# Patient Record
Sex: MALE | Race: WHITE | Employment: UNEMPLOYED | URBAN - METROPOLITAN AREA
[De-identification: names, ages, dates, MRNs, and addresses within clinical notes are randomized per-mention and may not be internally consistent; named-entity substitution may affect disease eponyms.]

---

## 2024-04-09 ENCOUNTER — HOSPITAL ENCOUNTER (INPATIENT)
Facility: HOSPITAL | Age: 64
LOS: 3 days | Discharge: HOME | End: 2024-04-12
Attending: STUDENT IN AN ORGANIZED HEALTH CARE EDUCATION/TRAINING PROGRAM | Admitting: STUDENT IN AN ORGANIZED HEALTH CARE EDUCATION/TRAINING PROGRAM
Payer: MEDICARE

## 2024-04-09 DIAGNOSIS — F31.9 BIPOLAR 1 DISORDER (HCC): Primary | ICD-10-CM

## 2024-04-09 LAB
GLUCOSE BLD STRIP.AUTO-MCNC: 170 MG/DL (ref 65–117)
SERVICE CMNT-IMP: ABNORMAL

## 2024-04-09 PROCEDURE — 82962 GLUCOSE BLOOD TEST: CPT

## 2024-04-09 PROCEDURE — 6370000000 HC RX 637 (ALT 250 FOR IP): Performed by: PSYCHIATRY & NEUROLOGY

## 2024-04-09 PROCEDURE — 1240000000 HC EMOTIONAL WELLNESS R&B

## 2024-04-09 RX ORDER — HALOPERIDOL 5 MG/ML
5 INJECTION INTRAMUSCULAR EVERY 4 HOURS PRN
Status: DISCONTINUED | OUTPATIENT
Start: 2024-04-09 | End: 2024-04-12 | Stop reason: HOSPADM

## 2024-04-09 RX ORDER — HYDROXYZINE HYDROCHLORIDE 25 MG/1
50 TABLET, FILM COATED ORAL 3 TIMES DAILY PRN
Status: DISCONTINUED | OUTPATIENT
Start: 2024-04-09 | End: 2024-04-09

## 2024-04-09 RX ORDER — CLONAZEPAM 1 MG/1
1 TABLET ORAL 3 TIMES DAILY PRN
Status: DISCONTINUED | OUTPATIENT
Start: 2024-04-09 | End: 2024-04-12 | Stop reason: HOSPADM

## 2024-04-09 RX ORDER — BUPROPION HYDROCHLORIDE 150 MG/1
300 TABLET ORAL EVERY MORNING
Status: DISCONTINUED | OUTPATIENT
Start: 2024-04-09 | End: 2024-04-12 | Stop reason: HOSPADM

## 2024-04-09 RX ORDER — DIPHENHYDRAMINE HYDROCHLORIDE 50 MG/ML
50 INJECTION INTRAMUSCULAR; INTRAVENOUS EVERY 4 HOURS PRN
Status: DISCONTINUED | OUTPATIENT
Start: 2024-04-09 | End: 2024-04-12 | Stop reason: HOSPADM

## 2024-04-09 RX ORDER — ACETAMINOPHEN 325 MG/1
650 TABLET ORAL EVERY 4 HOURS PRN
Status: DISCONTINUED | OUTPATIENT
Start: 2024-04-09 | End: 2024-04-12 | Stop reason: HOSPADM

## 2024-04-09 RX ORDER — PENTOXIFYLLINE 400 MG/1
800 TABLET, EXTENDED RELEASE ORAL 2 TIMES DAILY
Status: DISCONTINUED | OUTPATIENT
Start: 2024-04-09 | End: 2024-04-12 | Stop reason: HOSPADM

## 2024-04-09 RX ORDER — BUPROPION HYDROCHLORIDE 300 MG/1
300 TABLET ORAL EVERY MORNING
Status: ON HOLD | COMMUNITY
End: 2024-04-12

## 2024-04-09 RX ORDER — POLYETHYLENE GLYCOL 3350 17 G/17G
17 POWDER, FOR SOLUTION ORAL DAILY PRN
Status: DISCONTINUED | OUTPATIENT
Start: 2024-04-09 | End: 2024-04-12 | Stop reason: HOSPADM

## 2024-04-09 RX ORDER — MAGNESIUM HYDROXIDE/ALUMINUM HYDROXICE/SIMETHICONE 120; 1200; 1200 MG/30ML; MG/30ML; MG/30ML
30 SUSPENSION ORAL EVERY 6 HOURS PRN
Status: DISCONTINUED | OUTPATIENT
Start: 2024-04-09 | End: 2024-04-12 | Stop reason: HOSPADM

## 2024-04-09 RX ORDER — ROSUVASTATIN CALCIUM 5 MG/1
5 TABLET, COATED ORAL DAILY
Status: ON HOLD | COMMUNITY
End: 2024-04-12

## 2024-04-09 RX ORDER — GABAPENTIN 300 MG/1
300 CAPSULE ORAL 3 TIMES DAILY
Status: ON HOLD | COMMUNITY
End: 2024-04-12

## 2024-04-09 RX ORDER — MINOXIDIL 2.5 MG/1
7.5 TABLET ORAL DAILY
Status: DISCONTINUED | OUTPATIENT
Start: 2024-04-09 | End: 2024-04-12 | Stop reason: HOSPADM

## 2024-04-09 RX ORDER — PENTOXIFYLLINE 400 MG/1
800 TABLET, EXTENDED RELEASE ORAL 2 TIMES DAILY
Status: ON HOLD | COMMUNITY
End: 2024-04-12

## 2024-04-09 RX ORDER — HALOPERIDOL 5 MG/1
5 TABLET ORAL EVERY 4 HOURS PRN
Status: DISCONTINUED | OUTPATIENT
Start: 2024-04-09 | End: 2024-04-12 | Stop reason: HOSPADM

## 2024-04-09 RX ORDER — TADALAFIL 10 MG/1
10 TABLET ORAL DAILY
COMMUNITY

## 2024-04-09 RX ORDER — TRAZODONE HYDROCHLORIDE 50 MG/1
50 TABLET ORAL NIGHTLY PRN
Status: DISCONTINUED | OUTPATIENT
Start: 2024-04-09 | End: 2024-04-12 | Stop reason: HOSPADM

## 2024-04-09 RX ORDER — ROSUVASTATIN CALCIUM 10 MG/1
5 TABLET, COATED ORAL DAILY
Status: DISCONTINUED | OUTPATIENT
Start: 2024-04-09 | End: 2024-04-12 | Stop reason: HOSPADM

## 2024-04-09 RX ORDER — FINASTERIDE 5 MG/1
5 TABLET, FILM COATED ORAL DAILY
Status: DISCONTINUED | OUTPATIENT
Start: 2024-04-09 | End: 2024-04-12 | Stop reason: HOSPADM

## 2024-04-09 RX ORDER — GABAPENTIN 300 MG/1
300 CAPSULE ORAL 3 TIMES DAILY
Status: DISCONTINUED | OUTPATIENT
Start: 2024-04-09 | End: 2024-04-12 | Stop reason: HOSPADM

## 2024-04-09 RX ORDER — CLONAZEPAM 1 MG/1
1 TABLET ORAL 3 TIMES DAILY PRN
Status: ON HOLD | COMMUNITY
End: 2024-04-12

## 2024-04-09 RX ORDER — DUTASTERIDE 0.5 MG/1
0.5 CAPSULE, LIQUID FILLED ORAL DAILY
Status: ON HOLD | COMMUNITY
End: 2024-04-12

## 2024-04-09 RX ORDER — MINOXIDIL 2.5 MG/1
7.5 TABLET ORAL DAILY
Status: ON HOLD | COMMUNITY
End: 2024-04-12

## 2024-04-09 RX ADMIN — CLONAZEPAM 1 MG: 1 TABLET ORAL at 22:33

## 2024-04-09 RX ADMIN — FINASTERIDE 5 MG: 5 TABLET, FILM COATED ORAL at 14:32

## 2024-04-09 RX ADMIN — ROSUVASTATIN CALCIUM 5 MG: 10 TABLET, COATED ORAL at 14:31

## 2024-04-09 RX ADMIN — METFORMIN HYDROCHLORIDE 1000 MG: 500 TABLET ORAL at 16:58

## 2024-04-09 RX ADMIN — GABAPENTIN 300 MG: 300 CAPSULE ORAL at 22:25

## 2024-04-09 RX ADMIN — PENTOXIFYLLINE 800 MG: 400 TABLET, EXTENDED RELEASE ORAL at 23:01

## 2024-04-09 RX ADMIN — BUPROPION HYDROCHLORIDE 300 MG: 150 TABLET, FILM COATED, EXTENDED RELEASE ORAL at 14:31

## 2024-04-09 RX ADMIN — GABAPENTIN 300 MG: 300 CAPSULE ORAL at 14:34

## 2024-04-09 RX ADMIN — MINOXIDIL 7.5 MG: 2.5 TABLET ORAL at 16:33

## 2024-04-09 ASSESSMENT — SLEEP AND FATIGUE QUESTIONNAIRES
DO YOU HAVE DIFFICULTY SLEEPING: NO
DO YOU USE A SLEEP AID: NO
AVERAGE NUMBER OF SLEEP HOURS: 8

## 2024-04-09 ASSESSMENT — PAIN SCALES - GENERAL
PAINLEVEL_OUTOF10: 3
PAINLEVEL_OUTOF10: 4

## 2024-04-09 ASSESSMENT — PAIN DESCRIPTION - LOCATION
LOCATION: FOOT
LOCATION: FOOT

## 2024-04-09 ASSESSMENT — PAIN DESCRIPTION - ORIENTATION
ORIENTATION: RIGHT
ORIENTATION: RIGHT;LEFT

## 2024-04-09 ASSESSMENT — LIFESTYLE VARIABLES
HOW MANY STANDARD DRINKS CONTAINING ALCOHOL DO YOU HAVE ON A TYPICAL DAY: PATIENT DOES NOT DRINK
HOW OFTEN DO YOU HAVE A DRINK CONTAINING ALCOHOL: NEVER

## 2024-04-09 ASSESSMENT — PAIN DESCRIPTION - DESCRIPTORS
DESCRIPTORS: ACHING
DESCRIPTORS: ACHING

## 2024-04-09 NOTE — PROGRESS NOTES
Patient arrived on Unit at 0655 in transfer from [facility] ED. C-SSRS scored high on patient's arrival. Patient received skin search for safety by myself and Reginaldo EARLT with [facility]. Patient is placed on 1:1 observation with Reginaldo DEL TORO monitoring patient. Patient currently denies thoughts, plans or intent to harm self.

## 2024-04-09 NOTE — GROUP NOTE
Group Therapy Note    Date: 4/9/2024    Group Start Time: 1400  Group End Time: 1500  Group Topic: Recreational    [facility]    Melba Weir        Group Therapy Note    Attendees: 5       Patient's Goal:  To concentrate on selected task    Notes:  Pt did not attend session  Discipline Responsible: Recreational Therapist      Signature:  MELBA WEIR

## 2024-04-09 NOTE — BH NOTE
[facility]  Admission Note     Admission Type: TDO      Reason for admission:   Pt with diagnosis of bipolar d/o, polysubstance use d/o..  pt began having paranoid delusions whil on meth which led him to attemp uicide by taking 60-70  Gabapentin and 10-15 Klonopin which did not show up in his blood work (klonopin).  Pt states people are watching him in attic and camera in bathroom mirror.  This ws his 3rd attempt in the last 5 years with the same method.         Medical Problems:   Past Medical History:   Diagnosis Date    Depression     Pt been on disability for Depression since his 40's    DM (diabetes mellitus) ([facility], located within [facility])     non insulin    Reflux gastritis        Status EXAM:  Mental Status and Behavioral Exam  Normal: No  Level of Assistance: Independent/Self  Facial Expression: Sad, Flat  Affect: Constricted  Level of Consciousness: Alert  Frequency of Checks: 4 times per hour, close  Mood:Normal: No  Mood: Depressed, Anxious  Motor Activity:Normal: No  Motor Activity: Decreased  Eye Contact: Fair  Observed Behavior: Withdrawn  Sexual Misconduct History: Current - no  Preception: [place] to person, [place] to time, [place] to place, [place] to situation  Attention:Normal: Yes  Thought Processes: Unremarkable  Thought Content:Normal: No  Thought Content: Preoccupations  Depression Symptoms: Isolative  Anxiety Symptoms: Generalized  Jada Symptoms: No problems reported or observed.  Hallucinations: None  Delusions: No  Memory:Normal: Yes  Insight and Judgment: No  Insight and Judgment: Poor judgment    Pt admitted with followings belongings:  Dental Appliances: None  Vision - Corrective Lenses: Contact Lenses (one eye only - provided container with solution for pt)  Hearing Aid: None  Jewelry: None  Body Piercings Removed: No  Clothing: Shirt, Socks, Pants, Jacket/Coat  Other Valuables: Cigarettes, Keys, Wallet, Credit/Debit Card, Lighter/Matches     Valuables placed in safe in security envelope.

## 2024-04-09 NOTE — PROGRESS NOTES
[facility] Admission Pharmacy Medication Reconciliation    Information obtained from: Patient interview, RxQuery, [facility]  RxQuery data available1: Yes    Comments/recommendations:  Patient is a good medication historian - able to recite names, dosages and frequencies of most of his medications. He reports compliance with medications.  However, TDO pre-screening collateral patient will stockpile gabapentin and clonazepam so he will have enough pills to overdose on.  Will go to different doctors to obtain prescriptions.      Reports taking clonazepam scheduled TID but prescription is written for TID PRN.  Based on information above, updated PTA list with TID PRN frequency.     The [place] Prescription Monitoring Program () was accessed to determine fill history of any controlled medications.  Routinely fills clonazepam 1 mg tablets - most recently filled #90 for 30 DS on 3/19/24.  Despite what collateral reported in pre-screening assessment, the majority of scripts were written by the same docotor.  Routinely fills gabapentin 300 mg capsules - most recently filled #270 for 90 DS on 2/22/24. Prescribed by same provider who prescribes clonazepam    Medication changes (since last review):  Added  Added all medications listed below  Removed  None  Adjusted  None       1RxQuery pharmacy benefit data reflects medications filled and processed through the patient's insurance, however                this data does NOT capture whether the medication was picked up or is currently being taken by the patient.         Patient allergies:   Allergies as of 04/09/2024    (No Known Allergies)       Prior to Admission Medications   Prescriptions Last Dose Informant   Arginine 1000 MG TABS  Self   Sig: Take 3,000 mg by mouth daily   NONFORMULARY  Self   Sig: \"2perday\" vitamin & mineral supplement   buPROPion (WELLBUTRIN XL) 300 MG extended release tablet 4/7/2024 Self, Outside Pharmacy/PCP   Sig: Take 1 tablet by mouth every morning

## 2024-04-09 NOTE — PROGRESS NOTES
Behavioral Services  Medicare Certification Upon Admission    I certify that this patient's inpatient psychiatric hospital admission is medically necessary for:    [x] (1) Treatment which could reasonably be expected to improve this patient's condition,       [x] (2) Or for diagnostic study;     AND     [x](2) The inpatient psychiatric services are provided while the individual is under the care of a physician and are included in the individualized plan of care.    Estimated length of stay/service 5 - 7 days    Plan for post-hospital care per social work    Electronically signed by Sky Bradshaw MD on 4/9/2024 at 12:07 PM

## 2024-04-09 NOTE — BH NOTE
Dr Bradshaw notified of C-SSRS score of high and patient's presentation. Constant observation and suicide precautions discontinued due to patient's denial of current SI and he presents as without intent to harm himself within the hospital. Will monitor with q 15 minute observation.

## 2024-04-09 NOTE — PLAN OF CARE
Problem: Self Harm/Suicidality  Goal: Will have no self-injury during hospital stay  Description: INTERVENTIONS:  1.  Ensure constant observer at bedside with Q15M safety checks  2.  Maintain a safe environment  3.  Secure patient belongings  4.  Ensure family/visitors adhere to safety recommendations  5.  Ensure safety tray has been added to patient's diet order  6.  Every shift and PRN: Re-assess suicidal risk via Frequent Screener    4/9/2024 1834 by Vi Macias, RN  Outcome: Not Progressing  4/9/2024 0735 by Lexi Lorenzana, RN  Outcome: Progressing     Problem: Anxiety  Goal: Will report anxiety at manageable levels  Description: INTERVENTIONS:  1. Administer medication as ordered  2. Teach and rehearse alternative coping skills  3. Provide emotional support with 1:1 interaction with staff  Outcome: Not Progressing     Problem: Depression/Self Harm  Goal: Effect of psychiatric condition will be minimized and patient will be protected from self harm  Description: INTERVENTIONS:  1. Assess impact of patient's symptoms on level of functioning, self care needs and offer support as indicated  2. Assess patient/family knowledge of depression, impact on illness and need for teaching  3. Provide emotional support, presence and reassurance  4. Assess for possible suicidal thoughts or ideation. If patient expresses suicidal thoughts or statements do not leave alone, initiate Suicide Precautions, move to a room close to the nursing station and obtain sitter  5. Initiate consults as appropriate with Mental Health Professional, Spiritual Care, Psychosocial CNS, and consider a recommendation to the LIP for a Psychiatric Consultation  Outcome: Not Progressing

## 2024-04-10 PROCEDURE — 6370000000 HC RX 637 (ALT 250 FOR IP): Performed by: PSYCHIATRY & NEUROLOGY

## 2024-04-10 PROCEDURE — 1240000000 HC EMOTIONAL WELLNESS R&B

## 2024-04-10 RX ADMIN — MINOXIDIL 7.5 MG: 2.5 TABLET ORAL at 08:51

## 2024-04-10 RX ADMIN — GABAPENTIN 300 MG: 300 CAPSULE ORAL at 12:58

## 2024-04-10 RX ADMIN — ROSUVASTATIN CALCIUM 5 MG: 10 TABLET, COATED ORAL at 08:51

## 2024-04-10 RX ADMIN — GABAPENTIN 300 MG: 300 CAPSULE ORAL at 21:01

## 2024-04-10 RX ADMIN — CLONAZEPAM 1 MG: 1 TABLET ORAL at 08:51

## 2024-04-10 RX ADMIN — FINASTERIDE 5 MG: 5 TABLET, FILM COATED ORAL at 08:51

## 2024-04-10 RX ADMIN — BUPROPION HYDROCHLORIDE 300 MG: 150 TABLET, FILM COATED, EXTENDED RELEASE ORAL at 08:51

## 2024-04-10 RX ADMIN — PENTOXIFYLLINE 800 MG: 400 TABLET, EXTENDED RELEASE ORAL at 21:01

## 2024-04-10 RX ADMIN — PENTOXIFYLLINE 800 MG: 400 TABLET, EXTENDED RELEASE ORAL at 11:00

## 2024-04-10 RX ADMIN — GABAPENTIN 300 MG: 300 CAPSULE ORAL at 08:50

## 2024-04-10 RX ADMIN — METFORMIN HYDROCHLORIDE 1000 MG: 500 TABLET ORAL at 08:51

## 2024-04-10 RX ADMIN — CLONAZEPAM 1 MG: 1 TABLET ORAL at 17:08

## 2024-04-10 RX ADMIN — METFORMIN HYDROCHLORIDE 1000 MG: 500 TABLET ORAL at 17:08

## 2024-04-10 NOTE — PROGRESS NOTES
Johnathon remained in his room and did not socialize. He denied SI, HI, AVH. He c/o pain in his right foot, states it's chronic, rated it about a 3. He refused offer of PRN for pain. He was med compliant with his scheduled meds. He requested and received PRN Klonopin for anxiety and appeared to be sleeping when reassessed an hour later.

## 2024-04-10 NOTE — PLAN OF CARE
DESTINI Elliott  Outcome: Progressing  4/9/2024 1839 by Vi Macias RN  Outcome: Not Progressing     Problem: Spiritual Care  Goal: Pt/Family able to move forward in process of forgiving self, others, and/or higher power  Description: INTERVENTIONS:  1. Assist patient/family to overcome blocks to healing by use of spiritual practices (prayer, meditation, guided imagery, reiki, breath work, etc).  2. De-myth guilt and help patient/family identify possible irrational spiritual/cultural beliefs and values.  3. Explore possibilities of making amends & reconciliation with self, others, and/or a greater power.  4. Guide patient/family in identifying painful feelings of guilt.  5. Help patient/famiy explore and identify spiritual beliefs, cultural understandings or values that may help or hinder letting go of issue.  6. Help patient/family explore feelings of anger, bitterness, resentment.  7. Help patient/family identify and examine the situation in which these feelings are experienced.  8. Help patient/family identify destructive displacement of feelings onto other individuals.  9. Invite use of sacraments/rituals/ceremonies as appropriate (e.g. - confession, anointing, smudging).  10. Refer patient/family to formal counseling and/or to toni community for further support work.  Outcome: Progressing     Problem: Self Harm/Suicidality  Goal: Will have no self-injury during hospital stay  Description: INTERVENTIONS:  1.  Ensure constant observer at bedside with Q15M safety checks  2.  Maintain a safe environment  3.  Secure patient belongings  4.  Ensure family/visitors adhere to safety recommendations  5.  Ensure safety tray has been added to patient's diet order  6.  Every shift and PRN: Re-assess suicidal risk via Frequent Screener    4/10/2024 0136 by Lexi Lorenzana RN  Outcome: Progressing  4/10/2024 0133 by Lexi Lorenzana RN  Outcome: Progressing  4/9/2024 1834 by Vi Macias RN  Outcome: Not Progressing     Problem:  Anxiety  Goal: Will report anxiety at manageable levels  Description: INTERVENTIONS:  1. Administer medication as ordered  2. Teach and rehearse alternative coping skills  3. Provide emotional support with 1:1 interaction with staff  4/10/2024 0136 by Lexi Lorenzana RN  Outcome: Progressing  4/10/2024 0133 by Lexi Lorenzana RN  Outcome: Progressing  4/9/2024 1839 by Vi Macias RN  Outcome: Not Progressing     Problem: Depression/Self Harm  Goal: Effect of psychiatric condition will be minimized and patient will be protected from self harm  Description: INTERVENTIONS:  1. Assess impact of patient's symptoms on level of functioning, self care needs and offer support as indicated  2. Assess patient/family knowledge of depression, impact on illness and need for teaching  3. Provide emotional support, presence and reassurance  4. Assess for possible suicidal thoughts or ideation. If patient expresses suicidal thoughts or statements do not leave alone, initiate Suicide Precautions, move to a room close to the nursing station and obtain sitter  5. Initiate consults as appropriate with Mental Health Professional, Spiritual Care, Psychosocial CNS, and consider a recommendation to the LIP for a Psychiatric Consultation  4/9/2024 1839 by Vi Macias, RN  Outcome: Not Progressing

## 2024-04-10 NOTE — GROUP NOTE
Group Therapy Note    Date: 4/10/2024    Group Start Time: 1400  Group End Time: 1500  Group Topic: Recreational    [facility] ACUTE [facility]    Melba Weir        Group Therapy Note    Attendees: 5       Patient's Goal:  To concentrate on selected task    Notes:  Pt did not attend session    Discipline Responsible: Recreational Therapist      Signature:  MELBA WEIR

## 2024-04-10 NOTE — H&P
[facility]               [address] [place]  [address]                                PSYCH H&P      PATIENT NAME: LIBBY GAMBLE              : 1960  MED REC NO: [id number]                       ROOM: 313  ACCOUNT NO: [id number]                       ADMIT DATE: 2024  PROVIDER: Sky Bradshaw MD    DATE:  2024    Psychiatric Intake Note    CHIEF COMPLAINT:  Mainly depression.    HISTORY OF PRESENT ILLNESS:  This is a 63-year-old male with history of depression, bipolar disorder, polysubstance abuse, who came to the hospital from [facility] under TDO due to statements that he was going to kill himself by overdosing on his gabapentin and Klonopin.  He states it was not really an attempt.  He is on disability for his depression and he was depressed for certain, but did not really want to die.  He has made some statements.  He had left one hospital in [place], [place] and then he ended up back in [facility] and when he tried to leave then, he was TDO'ed and sent over to this facility.  Denies suicidal or homicidal ideations.  Currently, no auditory or visual hallucinations, but acknowledges that he was depressed and we need to get back on his medications since he has been off for a little while.  Here for management of his condition.  Reported some paranoia, feels like people are watching him or there might be a camera hidden in his bathroom mirrors, over the attic.  He has had 2 prior psychiatric admissions and suicide attempts and so he was brought in for management of his condition.    PAST PSYCHIATRIC HISTORY:  Bipolar depression treated by lithium, currently off medications, prior hospitalizations and suicide attempts.    PAST MEDICAL HISTORY:  Diabetes, gastritis, reflux, seasonal allergies.    ALLERGIES:  NONE KNOWN DRUG ALLERGIES OTHERWISE.      SOCIAL HISTORY:  Never .  No children.  On disability for what he says is depression.    MENTAL STATUS  EXAMINATION:  Adult male, calm, cooperative, clear, coherent. Speech of average rate, volume, and tone.  Mood is depressed.  Affect withdrawn.  Thoughts are linear, goal-directed.  Denies active suicidal or homicidal ideations and no auditory or visual hallucinations.  Aware of his surroundings, locations, situation, here for management of his condition.    DIAGNOSIS:  Bipolar depression, acute exacerbation.    PLAN OF TREATMENT:  Admit for safety, stabilization, medication modifications needed, group therapy, individual therapy.    ESTIMATED LENGTH OF STAY:  5-7 days.    DISPOSITION:  Planning with social work, strengths, willingness for treatment, disabilities, limited supports, compliance issues, substance use.        MD ALVERTO ARNDT/KARLO  D:  04/10/2024 03:25:08  T:  04/10/2024 05:52:22  JOB #:  [id number]

## 2024-04-10 NOTE — PLAN OF CARE
Problem: Depression/Self Harm  Goal: Effect of psychiatric condition will be minimized and patient will be protected from self harm  Description: INTERVENTIONS:  1. Assess impact of patient's symptoms on level of functioning, self care needs and offer support as indicated  2. Assess patient/family knowledge of depression, impact on illness and need for teaching  3. Provide emotional support, presence and reassurance  4. Assess for possible suicidal thoughts or ideation. If patient expresses suicidal thoughts or statements do not leave alone, initiate Suicide Precautions, move to a room close to the nursing station and obtain sitter  5. Initiate consults as appropriate with Mental Health Professional, Spiritual Care, Psychosocial CNS, and consider a recommendation to the LIP for a Psychiatric Consultation  4/9/2024 1839 by Vi Macias RN  Outcome: Not Progressing     Problem: Self Harm/Suicidality  Goal: Will have no self-injury during hospital stay  Description: INTERVENTIONS:  1.  Ensure constant observer at bedside with Q15M safety checks  2.  Maintain a safe environment  3.  Secure patient belongings  4.  Ensure family/visitors adhere to safety recommendations  5.  Ensure safety tray has been added to patient's diet order  6.  Every shift and PRN: Re-assess suicidal risk via Frequent Screener    4/10/2024 0133 by Lexi Lorenzana RN  Outcome: Progressing  4/9/2024 1834 by Vi Macias RN  Outcome: Not Progressing     Problem: Pain  Goal: Verbalizes/displays adequate comfort level or baseline comfort level  Outcome: Progressing     Problem: Anxiety  Goal: Will report anxiety at manageable levels  Description: INTERVENTIONS:  1. Administer medication as ordered  2. Teach and rehearse alternative coping skills  3. Provide emotional support with 1:1 interaction with staff  4/10/2024 0133 by Lexi Lorenzana RN  Outcome: Progressing  4/9/2024 1839 by Vi Macias RN  Outcome: Not Progressing     Problem: Self

## 2024-04-10 NOTE — PLAN OF CARE
Problem: Discharge Planning  Goal: Discharge to home or other facility with appropriate resources  Outcome: Progressing     Problem: Self Harm/Suicidality  Goal: Will have no self-injury during hospital stay  Description: INTERVENTIONS:  1.  Ensure constant observer at bedside with Q15M safety checks  2.  Maintain a safe environment  3.  Secure patient belongings  4.  Ensure family/visitors adhere to safety recommendations  5.  Ensure safety tray has been added to patient's diet order  6.  Every shift and PRN: Re-assess suicidal risk via Frequent Screener    4/10/2024 1954 by Sarbjit Luque RN  Outcome: Progressing  4/10/2024 0953 by Celia Castillo, RN  Outcome: Progressing     Problem: Pain  Goal: Verbalizes/displays adequate comfort level or baseline comfort level  Outcome: Progressing

## 2024-04-10 NOTE — BH NOTE
Johnathon requested medication for anxiety. Klonopin 1 mg was given as needed with positive effect. His anxiety has lessened to a manageable level.

## 2024-04-10 NOTE — PLAN OF CARE
Problem: Self Harm/Suicidality  Goal: Will have no self-injury during hospital stay  Description: INTERVENTIONS:  1.  Ensure constant observer at bedside with Q15M safety checks  2.  Maintain a safe environment  3.  Secure patient belongings  4.  Ensure family/visitors adhere to safety recommendations  5.  Ensure safety tray has been added to patient's diet order  6.  Every shift and PRN: Re-assess suicidal risk via Frequent Screener    4/10/2024 0953 by Celia Castillo RN  Outcome: Progressing

## 2024-04-10 NOTE — BH NOTE
PSYCHOSOCIAL ASSESSMENT  :Patient identifying info:   Johnathon Ko is a 63 y.o., male admitted 2024  6:33 AM     Presenting problem and precipitating factors: Patient is a 63 year old male with a previous hx of Bipolar Disorder who was transferred from [facility] under a TDO due to suicidal gestures. He became a TDO due to leaving a hospital in [place] and ended up back in the hospital at [facility]. Patient reports that he has previously been on Lithium.     Mental status assessment: alert and oriented x 4. Pt denies SI/HI    Strengths/Recreation/Coping Skills:stable housing, active insurance, family support    Collateral information:     Current psychiatric /substance abuse providers and contact info: no current    Previous psychiatric/substance abuse providers and response to treatment:     Family history of mental illness or substance abuse: yes    Substance abuse history: UDS+ [place]    Social History     Tobacco Use    Smoking status: Every Day     Current packs/day: 0.50     Average packs/day: 0.5 packs/day for 54.3 years (27.1 ttl pk-yrs)     Types: Cigarettes     Start date:     Smokeless tobacco: Never   Substance Use Topics    Alcohol use: Never       History of biomedical complications associated with substance abuse: denies    Patient's current acceptance of treatment or motivation for change: fair    Family constellation: patient is single never  with no children    Is significant other involved?     Describe support system: family    Describe living arrangements and home environment: lives alone    GUARDIAN/POA: No    Guardian Name:     Guardian Contact:     Health issues:     Trauma history: none    Legal issues: none    History of  service: none    Financial status: unemployed    Temple/cultural factors: none    Education/work history: some college, unemployed    Have you been licensed as a health care professional (current or ): n/a    Describe coping skills: limited and

## 2024-04-10 NOTE — BH NOTE
Johnathon is alert, calm, cooperative, pleasant. He denied all SI/HI/AVH/pain/depression. He endorsed anxiety 5/10 and requested medication. Klonopin 1 mg po PRN was given as needed with positive effect. He currently denies feeling anxious. He is medication and meal compliant. No distress observed. No other concerns expressed at this time. Q 15 minute checks ongoing to ensure patient safety.

## 2024-04-11 PROCEDURE — 6370000000 HC RX 637 (ALT 250 FOR IP): Performed by: PSYCHIATRY & NEUROLOGY

## 2024-04-11 PROCEDURE — 1240000000 HC EMOTIONAL WELLNESS R&B

## 2024-04-11 RX ORDER — LITHIUM CARBONATE 450 MG
450 TABLET, EXTENDED RELEASE ORAL NIGHTLY
Status: DISCONTINUED | OUTPATIENT
Start: 2024-04-11 | End: 2024-04-12 | Stop reason: HOSPADM

## 2024-04-11 RX ADMIN — METFORMIN HYDROCHLORIDE 1000 MG: 500 TABLET ORAL at 08:36

## 2024-04-11 RX ADMIN — CLONAZEPAM 1 MG: 1 TABLET ORAL at 14:39

## 2024-04-11 RX ADMIN — GABAPENTIN 300 MG: 300 CAPSULE ORAL at 20:12

## 2024-04-11 RX ADMIN — CLONAZEPAM 1 MG: 1 TABLET ORAL at 08:40

## 2024-04-11 RX ADMIN — CLONAZEPAM 1 MG: 1 TABLET ORAL at 20:13

## 2024-04-11 RX ADMIN — BUPROPION HYDROCHLORIDE 300 MG: 150 TABLET, FILM COATED, EXTENDED RELEASE ORAL at 08:36

## 2024-04-11 RX ADMIN — PENTOXIFYLLINE 800 MG: 400 TABLET, EXTENDED RELEASE ORAL at 08:46

## 2024-04-11 RX ADMIN — GABAPENTIN 300 MG: 300 CAPSULE ORAL at 08:36

## 2024-04-11 RX ADMIN — GABAPENTIN 300 MG: 300 CAPSULE ORAL at 13:26

## 2024-04-11 RX ADMIN — FINASTERIDE 5 MG: 5 TABLET, FILM COATED ORAL at 08:36

## 2024-04-11 RX ADMIN — MINOXIDIL 7.5 MG: 2.5 TABLET ORAL at 08:35

## 2024-04-11 RX ADMIN — ROSUVASTATIN CALCIUM 5 MG: 10 TABLET, COATED ORAL at 08:40

## 2024-04-11 RX ADMIN — LITHIUM CARBONATE 450 MG: 450 TABLET, EXTENDED RELEASE ORAL at 20:13

## 2024-04-11 RX ADMIN — METFORMIN HYDROCHLORIDE 1000 MG: 500 TABLET ORAL at 17:42

## 2024-04-11 RX ADMIN — PENTOXIFYLLINE 800 MG: 400 TABLET, EXTENDED RELEASE ORAL at 20:12

## 2024-04-11 ASSESSMENT — PAIN SCALES - GENERAL
PAINLEVEL_OUTOF10: 0
PAINLEVEL_OUTOF10: 3

## 2024-04-11 ASSESSMENT — PAIN DESCRIPTION - LOCATION: LOCATION: FOOT

## 2024-04-11 NOTE — BH NOTE
Psychiatric Progress Note    Patient: Johnathon Ko MRN: [id number]  SSN: [id number]    YOB: 1960  Age: 63 y.o.  Sex: male      Admit Date: 4/9/2024    LOS: 1 day     Subjective:     Johnathon Ko  reports feeling really well today. and moods are good.  Denies SI/HI/AH/VH.  No aggression or violence.  Appropriately interactive and aware. Tolerating medications well.  Eating well and sleeping well.    Objective:     Vitals:    04/09/24 1633 04/09/24 2015 04/10/24 0951 04/10/24 2041   BP: 112/88 102/73 110/83 111/70   Pulse:  75 81 85   Resp:  16 16 16   Temp:  98.2 °F (36.8 °C) 97.8 °F (36.6 °C) 98.3 °F (36.8 °C)   TempSrc:  Oral Oral Oral   SpO2:  96% 100% 100%   Weight:       Height:            Mental Status Exam:   Sensorium  oriented to time, place and person   Relations cooperative   Eye Contact appropriate   Appearance:  age appropriate   Speech:  normal volume and non-pressured   Thought Process: goal directed   Thought Content no hallucinations and no delusions   Suicidal ideations none   Mood:  depressed   Affect:  constricted   Memory   adequate   Concentration:  adequate   Insight:  limited   Judgment:  limited       MEDICATIONS:  Current Facility-Administered Medications   Medication Dose Route Frequency    acetaminophen (TYLENOL) tablet 650 mg  650 mg Oral Q4H PRN    polyethylene glycol (GLYCOLAX) packet 17 g  17 g Oral Daily PRN    aluminum & magnesium hydroxide-simethicone (MAALOX) 200-200-20 MG/5ML suspension 30 mL  30 mL Oral Q6H PRN    haloperidol (HALDOL) tablet 5 mg  5 mg Oral Q4H PRN    Or    haloperidol lactate (HALDOL) injection 5 mg  5 mg IntraMUSCular Q4H PRN    diphenhydrAMINE (BENADRYL) injection 50 mg  50 mg IntraMUSCular Q4H PRN    traZODone (DESYREL) tablet 50 mg  50 mg Oral Nightly PRN    buPROPion (WELLBUTRIN XL) extended release tablet 300 mg  300 mg Oral QAM    clonazePAM (KLONOPIN) tablet 1 mg  1 mg Oral TID PRN    finasteride (PROSCAR) tablet 5 mg  5 mg

## 2024-04-11 NOTE — GROUP NOTE
Group Therapy Note    Date: 4/11/2024    Group Start Time: 1400  Group End Time: 1500  Group Topic: Recreational    [facility]    Melba Weir        Group Therapy Note    Attendees: 4       Patient's Goal:  To concentrate on selected task    Notes:  Pt did not attend session    Discipline Responsible: Recreational Therapist      Signature:  MELBA WEIR

## 2024-04-11 NOTE — BH NOTE
Pt was received awake in bed, isolative to self. Pt's affect is flat and endorsed feeling anxious ( pt awaiting to take prn klonopin at next availability). Pt denies SI/HI and AVH. Pt ate snack in the dayroom with others but minimally social with peers. Pt took scheduled medication without issue. Q15 minute rounds in place, plan of care to continue.    Pt slept for 8 hours.

## 2024-04-11 NOTE — BH NOTE
Psychiatric Progress Note    Patient: Johnathon Ko MRN: [id number]  SSN: [id number]    YOB: 1960  Age: 63 y.o.  Sex: male      Admit Date: 4/9/2024    LOS: 2 days     Subjective:   4/11 -  Johnathon Ko reports feeling rested and moods are depressed.  Anxiety (0) is better than last evening (3).  Isolative to self.  Walking the hallway this afternoon.  Appropriately dressed and groomed.  Denies SI/HI/AH/VH.  No aggression or violence.  Appropriately interactive and aware.  Tolerating medications well (No prn).  Less pain today  Appetite is good Eating and sleeping fairly (8 hrs).      Johnathon Ko  reports feeling really well today. and moods are good.  Denies SI/HI/AH/VH.  No aggression or violence.  Appropriately interactive and aware. Tolerating medications well.  Eating well and sleeping well.    Objective:     Vitals:    04/10/24 0951 04/10/24 2041 04/11/24 0803 04/11/24 0835   BP: 110/83 111/70 110/80 110/80   Pulse: 81 85 65    Resp: 16 16 16    Temp: 97.8 °F (36.6 °C) 98.3 °F (36.8 °C) 97.7 °F (36.5 °C)    TempSrc: Oral Oral Oral    SpO2: 100% 100% 99%    Weight:       Height:            Mental Status Exam:   Sensorium  oriented to time, place and person   Relations cooperative   Eye Contact appropriate   Appearance:  age appropriate   Speech:  normal volume and non-pressured   Thought Process: goal directed   Thought Content no hallucinations and no delusions   Suicidal ideations none   Mood:  depressed   Affect:  constricted   Memory   adequate   Concentration:  adequate   Insight:  limited   Judgment:  limited       MEDICATIONS:  Current Facility-Administered Medications   Medication Dose Route Frequency    lithium (ESKALITH) extended release tablet 450 mg  450 mg Oral Nightly    acetaminophen (TYLENOL) tablet 650 mg  650 mg Oral Q4H PRN    polyethylene glycol (GLYCOLAX) packet 17 g  17 g Oral Daily PRN    aluminum & magnesium hydroxide-simethicone (MAALOX) 200-200-20 MG/5ML  suspension 30 mL  30 mL Oral Q6H PRN    haloperidol (HALDOL) tablet 5 mg  5 mg Oral Q4H PRN    Or    haloperidol lactate (HALDOL) injection 5 mg  5 mg IntraMUSCular Q4H PRN    diphenhydrAMINE (BENADRYL) injection 50 mg  50 mg IntraMUSCular Q4H PRN    traZODone (DESYREL) tablet 50 mg  50 mg Oral Nightly PRN    buPROPion (WELLBUTRIN XL) extended release tablet 300 mg  300 mg Oral QAM    clonazePAM (KLONOPIN) tablet 1 mg  1 mg Oral TID PRN    finasteride (PROSCAR) tablet 5 mg  5 mg Oral Daily    gabapentin (NEURONTIN) capsule 300 mg  300 mg Oral TID    metFORMIN (GLUCOPHAGE) tablet 1,000 mg  1,000 mg Oral BID WC    minoxidil (LONITEN) tablet 7.5 mg  7.5 mg Oral Daily    rosuvastatin (CRESTOR) tablet 5 mg  5 mg Oral Daily    pentoxifylline (TRENTAL) extended release tablet 800 mg  800 mg Oral BID      DISCUSSION:   the risks and benefits of the proposed medication    Lab/Data Review:  All lab results for the last 24 hours reviewed.    No results found for this or any previous visit (from the past 24 hour(s)).      Assessment:     Principal Problem:    Bipolar 1 disorder (HCC)  Resolved Problems:    * No resolved hospital problems. *      Plan:     Continue current care  Collateral information  Lithium 450 mg PO Qhs  Disposition planning with social work    I certify that this patient's inpatient psychiatric hospital services furnished since the previous certification were, and continue to be, required for treatment that could reasonably be expected to improve the patient's condition, or for diagnostic study, and that the patient continues to need, on a daily basis, active treatment furnished directly by or requiring the supervision of inpatient psychiatric facility personnel. In addition, the hospital records show that services furnished were intensive treatment services, admission or related services, or equivalent services.  Signed By: Sky Bradshaw MD     April 11, 2024

## 2024-04-11 NOTE — PROGRESS NOTES
Laboratory Monitoring for Mood Stabilizers and Antipsychotics    Recommended baseline monitoring has been completed based on this patient's current medication regimen    The following labs were completed at transferring facility: CBC, CMP, UDS, BAL, UA, TSH.  Please see transfer paperwork in paper chart/scanned in media tab or Care Everywhere for details.      This patient is currently prescribed the following medication(s):   Current Facility-Administered Medications: lithium (ESKALITH) extended release tablet 450 mg, 450 mg, Oral, Nightly  acetaminophen (TYLENOL) tablet 650 mg, 650 mg, Oral, Q4H PRN  polyethylene glycol (GLYCOLAX) packet 17 g, 17 g, Oral, Daily PRN  aluminum & magnesium hydroxide-simethicone (MAALOX) 200-200-20 MG/5ML suspension 30 mL, 30 mL, Oral, Q6H PRN  haloperidol (HALDOL) tablet 5 mg, 5 mg, Oral, Q4H PRN **OR** haloperidol lactate (HALDOL) injection 5 mg, 5 mg, IntraMUSCular, Q4H PRN  diphenhydrAMINE (BENADRYL) injection 50 mg, 50 mg, IntraMUSCular, Q4H PRN  traZODone (DESYREL) tablet 50 mg, 50 mg, Oral, Nightly PRN  buPROPion (WELLBUTRIN XL) extended release tablet 300 mg, 300 mg, Oral, QAM  clonazePAM (KLONOPIN) tablet 1 mg, 1 mg, Oral, TID PRN  finasteride (PROSCAR) tablet 5 mg, 5 mg, Oral, Daily  gabapentin (NEURONTIN) capsule 300 mg, 300 mg, Oral, TID  metFORMIN (GLUCOPHAGE) tablet 1,000 mg, 1,000 mg, Oral, BID WC  minoxidil (LONITEN) tablet 7.5 mg, 7.5 mg, Oral, Daily  rosuvastatin (CRESTOR) tablet 5 mg, 5 mg, Oral, Daily  pentoxifylline (TRENTAL) extended release tablet 800 mg, 800 mg, Oral, BID    The following labs have been completed for monitoring of antipsychotics and/or mood stabilizers:    Height, Weight, BMI Estimation  Estimated body mass index is 22.53 kg/m² as calculated from the following:    Height as of this encounter: 1.715 m (5' 7.5\").    Weight as of this encounter: 66.2 kg (146 lb).     Vital Signs/Blood Pressure  /80   Pulse 65   Temp 97.7 °F (36.5 °C)  (Oral)   Resp 16   Ht 1.715 m (5' 7.5\")   Wt 66.2 kg (146 lb)   SpO2 99%   BMI 22.53 kg/m²     Renal Function, Hepatic Function and Chemistry  Please see transfer paperwork in paper chart/scanned in media tab or Care Everywhere for details.     Hematology  Please see transfer paperwork in paper chart/scanned in media tab or Care Everywhere for details.     Thyroid Function  Please see transfer paperwork in paper chart/scanned in media tab or Care Everywhere for details.     Shelby Villela, PharmD, BCPS, BCPP  Clinical Pharmacy Specialist, Behavioral Health  Desk: [phone] ([phone])  Pharmacy: [phone] ([phone])

## 2024-04-11 NOTE — BH NOTE
Writer encountered patient in bedroom lying down.  Morning assessment complete.   Patient is calm and coopertaive  Eye contact is noted to be fair..   Mood is noted to be anxious.  Patient rated anxiety 7/10.  Patient denied depression.  Affect is noted to be constricted.  Patient denied SI/HI/AVH.  Patient has been isolative to room.    Patient stated having pain in thumbs and feet and rated pain 3/10. Writer offered PRN Tylenol and patient declined.    Patient requested and received PRN Clonazepam at 0840 for anxiety.    Patient requested and received PRN Clonazepam at 1439 for anxiety.    Patient is both med and meal compliant. Patient stated that appetite was good. Patient is a med seeker.There are no untoward side effects from medications to note.     C-SSRS level is no risk.    Patient stated that last bowel movement was yesterday.    Hourly rounds are being completed to assure patient safety and attend to care needs. Monitoring will continue for changes in patient condition.

## 2024-04-11 NOTE — BH NOTE
Behavioral Health Treatment Team Note     Patient goal(s) for today: to go back home  Treatment team focus/goals: continue monitor medication, adjust as needed     Admission type: Comitted    Progress note: Pt presents aox4. Pt is visible but minimally social with peers. Pt denies SI/HI/AVH. Pt endorses anxiety. Pt is medication compliant. Pt speech presets WNL. ADLs appear fair. Pt reports he wants to go back home. Pt reports he lives at the [facility]. SW was able to confirm that this is his apartment.    LOS:  2  Expected LOS: TBD    Insurance info/prescription coverage:  [employer]  Date of last family contact:     Family requesting physician contact today:  No  Discharge plan:  Home  Guns in the home:  No  Outpatient provider(s):  None    Participating treatment team members: Alisha Agee MSW Student

## 2024-04-12 VITALS
WEIGHT: 146 LBS | BODY MASS INDEX: 22.13 KG/M2 | HEART RATE: 69 BPM | TEMPERATURE: 97.9 F | DIASTOLIC BLOOD PRESSURE: 91 MMHG | HEIGHT: 68 IN | OXYGEN SATURATION: 100 % | RESPIRATION RATE: 18 BRPM | SYSTOLIC BLOOD PRESSURE: 121 MMHG

## 2024-04-12 PROCEDURE — 6370000000 HC RX 637 (ALT 250 FOR IP): Performed by: PSYCHIATRY & NEUROLOGY

## 2024-04-12 RX ORDER — LITHIUM CARBONATE 450 MG
450 TABLET, EXTENDED RELEASE ORAL NIGHTLY
Qty: 30 TABLET | Refills: 0 | Status: SHIPPED | OUTPATIENT
Start: 2024-04-12 | End: 2024-05-12

## 2024-04-12 RX ORDER — GABAPENTIN 300 MG/1
300 CAPSULE ORAL 3 TIMES DAILY
Qty: 90 CAPSULE | Refills: 0 | Status: SHIPPED | OUTPATIENT
Start: 2024-04-12 | End: 2024-05-12

## 2024-04-12 RX ORDER — CLONAZEPAM 1 MG/1
1 TABLET ORAL 3 TIMES DAILY PRN
Qty: 60 TABLET | Refills: 0 | Status: SHIPPED | OUTPATIENT
Start: 2024-04-12 | End: 2024-05-12

## 2024-04-12 RX ORDER — ROSUVASTATIN CALCIUM 5 MG/1
5 TABLET, COATED ORAL DAILY
Qty: 30 TABLET | Refills: 0 | Status: SHIPPED | OUTPATIENT
Start: 2024-04-12

## 2024-04-12 RX ORDER — BUPROPION HYDROCHLORIDE 300 MG/1
300 TABLET ORAL EVERY MORNING
Qty: 30 TABLET | Refills: 0 | Status: SHIPPED | OUTPATIENT
Start: 2024-04-12

## 2024-04-12 RX ORDER — PENTOXIFYLLINE 400 MG/1
800 TABLET, EXTENDED RELEASE ORAL 2 TIMES DAILY
Qty: 120 TABLET | Refills: 0 | Status: SHIPPED | OUTPATIENT
Start: 2024-04-12

## 2024-04-12 RX ORDER — DUTASTERIDE 0.5 MG/1
0.5 CAPSULE, LIQUID FILLED ORAL DAILY
Qty: 30 CAPSULE | Refills: 0 | Status: SHIPPED | OUTPATIENT
Start: 2024-04-12

## 2024-04-12 RX ORDER — MINOXIDIL 2.5 MG/1
7.5 TABLET ORAL DAILY
Qty: 90 TABLET | Refills: 0 | Status: SHIPPED | OUTPATIENT
Start: 2024-04-12

## 2024-04-12 RX ADMIN — CLONAZEPAM 1 MG: 1 TABLET ORAL at 08:43

## 2024-04-12 RX ADMIN — GABAPENTIN 300 MG: 300 CAPSULE ORAL at 08:43

## 2024-04-12 RX ADMIN — ROSUVASTATIN CALCIUM 5 MG: 10 TABLET, COATED ORAL at 08:43

## 2024-04-12 RX ADMIN — GABAPENTIN 300 MG: 300 CAPSULE ORAL at 14:06

## 2024-04-12 RX ADMIN — ALUMINUM HYDROXIDE, MAGNESIUM HYDROXIDE, AND SIMETHICONE 30 ML: 1200; 120; 1200 SUSPENSION ORAL at 09:51

## 2024-04-12 RX ADMIN — MINOXIDIL 7.5 MG: 2.5 TABLET ORAL at 08:42

## 2024-04-12 RX ADMIN — BUPROPION HYDROCHLORIDE 300 MG: 150 TABLET, FILM COATED, EXTENDED RELEASE ORAL at 08:43

## 2024-04-12 RX ADMIN — METFORMIN HYDROCHLORIDE 1000 MG: 500 TABLET ORAL at 08:43

## 2024-04-12 RX ADMIN — FINASTERIDE 5 MG: 5 TABLET, FILM COATED ORAL at 08:43

## 2024-04-12 RX ADMIN — PENTOXIFYLLINE 800 MG: 400 TABLET, EXTENDED RELEASE ORAL at 08:42

## 2024-04-12 RX ADMIN — CLONAZEPAM 1 MG: 1 TABLET ORAL at 14:06

## 2024-04-12 ASSESSMENT — PAIN SCALES - GENERAL
PAINLEVEL_OUTOF10: 0
PAINLEVEL_OUTOF10: 2

## 2024-04-12 ASSESSMENT — PAIN DESCRIPTION - DESCRIPTORS: DESCRIPTORS: BURNING

## 2024-04-12 ASSESSMENT — PAIN DESCRIPTION - LOCATION: LOCATION: OTHER (COMMENT)

## 2024-04-12 NOTE — DISCHARGE INSTRUCTIONS
DISCHARGE SUMMARY    NAME:Johnathon Ko  : 1960  MRN: [id number]    The patient Johnathon Ko exhibits the ability to control behavior in a less restrictive environment.  Patient's level of functioning is improving.  No assaultive/destructive behavior has been observed for the past 24 hours.  No suicidal/homicidal threat or behavior has been observed for the past 24 hours.  There is no evidence of serious medication side effects.  Patient has not been in physical or protective restraints for at least the past 24 hours.    If weapons involved, how are they secured? No weapons    Is patient aware of and in agreement with discharge plan? Yes    Arrangements for medication:  Prescriptions sent to pharmacy    Copy of discharge instructions to provider?:  Yes    Arrangements for transportation home:  Hospital to Home    Keep all follow up appointments as scheduled, continue to take prescribed medications per physician instructions.  Mental health crisis number:  911 or your local mental health crisis line number at [phone]       Mental Health Emergency WARM LINE      [phone])      M-F: 9am to 9pm      Sat & Sun: 5pm - 9pm  National suicide prevention lines:                             8-499-RBZZTDN ([phone])       7-224-462-TALK ([phone])    Crisis Text Line:  Text HOME to [phone]

## 2024-04-12 NOTE — DISCHARGE SUMMARY
PSYCHIATRIC DISCHARGE SUMMARY         IDENTIFICATION:    Patient Name  Johnathon Ko   Date of Birth 1960   [facility] [id number]   Medical Record Number  [id number]      Age  63 y.o.   PCP No primary care provider on file.   Admit date:  4/9/2024    Discharge date: 4/12/2024   Room Number  313/01  @ [facility]   Date of Service  4/12/2024            TYPE OF DISCHARGE: REGULAR               CONDITION AT DISCHARGE: Stable       PROVISIONAL & DISCHARGE DIAGNOSES:        Active Hospital Problems    *Bipolar 1 disorder (HCC)        DISCHARGE DIAGNOSIS:   Axis I:  SEE ABOVE  Axis II: SEE ABOVE  Axis III: SEE ABOVE  Axis IV:  lack of structure  Axis V:  <50 on admission, 55+ on discharge     CC & HISTORY OF PRESENT ILLNESS:  63-year-old male with history of depression, bipolar disorder, polysubstance abuse, who came to the hospital from [facility] under TDO due to statements that he was going to kill himself by overdosing on his gabapentin and Klonopin. He states it was not really an attempt. He is on disability for his depression and he was depressed for certain, but did not really want to die. He has made some statements. He had left one hospital in [place], [place] and then he ended up back in [facility] and when he tried to leave then, he was TDO'ed and sent over to this facility. Denies suicidal or homicidal ideations. Currently, no auditory or visual hallucinations, but acknowledges that he was depressed and we need to get back on his medications since he has been off for a little while. Here for management of his condition. Reported some paranoia, feels like people are watching him or there might be a camera hidden in his bathroom mirrors, over the attic. He has had 2 prior psychiatric admissions and suicide attempts and so he was brought in for management of his condition.      SOCIAL HISTORY:    Social History     Socioeconomic History    Marital status: Single     Spouse name: Not on file    Number of  tablet by mouth nightly            CONTINUE taking these medications      Arginine 1000 MG Tabs     buPROPion 300 MG extended release tablet  Commonly known as: WELLBUTRIN XL  Take 1 tablet by mouth every morning     clonazePAM 1 MG tablet  Commonly known as: KLONOPIN  Take 1 tablet by mouth 3 times daily as needed for Anxiety for up to 30 days. Max Daily Amount: 3 mg     dutasteride 0.5 MG capsule  Commonly known as: AVODART  Take 1 capsule by mouth daily     empagliflozin 25 MG tablet  Commonly known as: JARDIANCE  Take 1 tablet by mouth daily     gabapentin 300 MG capsule  Commonly known as: NEURONTIN  Take 1 capsule by mouth 3 times daily for 30 days. Max Daily Amount: 900 mg     metFORMIN 500 MG tablet  Commonly known as: GLUCOPHAGE  Take 2 tablets by mouth 2 times daily (with meals)     minoxidil 2.5 MG tablet  Commonly known as: LONITEN  Take 3 tablets by mouth daily     NONFORMULARY     pentoxifylline 400 MG extended release tablet  Commonly known as: TRENTAL  Take 2 tablets by mouth in the morning and at bedtime     rosuvastatin 5 MG tablet  Commonly known as: Crestor  Take 1 tablet by mouth daily     tadalafil 10 MG tablet  Commonly known as: CIALIS               Where to Get Your Medications        These medications were sent to [facility] - [place], [place] - [address] - P [phone] - F [phone] [address] [facility] [address]      Phone: [phone]   buPROPion 300 MG extended release tablet  clonazePAM 1 MG tablet  dutasteride 0.5 MG capsule  empagliflozin 25 MG tablet  gabapentin 300 MG capsule  lithium 450 MG extended release tablet  metFORMIN 500 MG tablet  minoxidil 2.5 MG tablet  pentoxifylline 400 MG extended release tablet  rosuvastatin 5 MG tablet                A coordinated, multidisplinary treatment team round was conducted with Johnathon Ko---this is done daily here at [facility]. This team consists of the nurse, psychiatric unit pharmacist, social

## 2024-04-12 NOTE — PLAN OF CARE
Problem: Behavior  Goal: Pt/Family maintain appropriate behavior and adhere to behavioral management agreement, if implemented  Description: INTERVENTIONS:  1. Assess patient/family's coping skills and  non-compliant behavior (including use of illegal substances)  2. Notify security of behavior or suspected illegal substances which indicate the need for search of the family and/or belongings  3. Encourage verbalization of thoughts and concerns in a socially appropriate manner  4. Utilize positive, consistent limit setting strategies supporting safety of patient, staff and others  5. Encourage participation in the decision making process about the behavioral management agreement  6. If a visitor's behavior poses a threat to safety call refer to organization policy.  7. Initiate consult with , Psychosocial CNS, Spiritual Care as appropriate  Outcome: Progressing     Problem: Anxiety  Goal: Will report anxiety at manageable levels  Description: INTERVENTIONS:  1. Administer medication as ordered  2. Teach and rehearse alternative coping skills  3. Provide emotional support with 1:1 interaction with staff  Outcome: Progressing     Problem: Coping  Goal: Pt/Family able to verbalize concerns and demonstrate effective coping strategies  Description: INTERVENTIONS:  1. Assist patient/family to identify coping skills, available support systems and cultural and spiritual values  2. Provide emotional support, including active listening and acknowledgement of concerns of patient and caregivers  3. Reduce environmental stimuli, as able  4. Instruct patient/family in relaxation techniques, as appropriate  5. Assess for spiritual pain/suffering and initiate Spiritual Care, Psychosocial Clinical Specialist consults as needed  4/11/2024 0955 by Suzanne Romero, RN  Outcome: Progressing

## 2024-04-12 NOTE — BH NOTE
Met with patient lying down resting in bed. Flat affect. Depressed mood. Anxious. Denies SI, HI and AVH. CSSRS No Risk. Denies pain. Compliant with medications. No side effects reported. PRN klonopin given for anxiety. Sleeping and eating well. Remains on Q15 minute rounds for safety.

## 2024-04-12 NOTE — GROUP NOTE
Group Therapy Note    Date: 4/12/2024    Group Start Time: 1400  Group End Time: 1500  Group Topic: Recreational    [facility] ACUTE BEHAV [facility]    Melba Weir        Group Therapy Note    Attendees: 6       Patient's Goal:  To concentrate on selected task      Status After Intervention:  Improved    Participation Level: Active Listener and Interactive    Participation Quality: Appropriate, Attentive, and Sharing      Speech:  normal      Thought Process/Content: Logical      Affective Functioning: Congruent      Mood: euthymic      Level of consciousness:  Alert, Oriented x4, and Attentive      Response to Learning: Progressing to goal      Endings: None Reported    Modes of Intervention: Activity      Discipline Responsible: Recreational Therapist      Signature:  MELBA WEIR

## 2024-04-12 NOTE — BH NOTE
Writer met patient lying quietly in his room, alert, and oriented x 4. Patient interacted well, able to communicate need and perform ADL, eye contact is good, verbalized feeling okay today, presented with flat affect and anxious mood. Patient endorsed anxiety at 6.5/10, denied depression, SI, HI, and AVH. PRN Klonopin administered. Pt is compliant with medication therapy and meals. Vital signs entered. Q 15 minutes and hourly rounds in progress. Pt slept for the total of 4 hours.

## 2024-04-12 NOTE — PLAN OF CARE
Problem: Self Harm/Suicidality  Goal: Will have no self-injury during hospital stay  Description: INTERVENTIONS:  1.  Ensure constant observer at bedside with Q15M safety checks  2.  Maintain a safe environment  3.  Secure patient belongings  4.  Ensure family/visitors adhere to safety recommendations  5.  Ensure safety tray has been added to patient's diet order  6.  Every shift and PRN: Re-assess suicidal risk via Frequent Screener    Outcome: Progressing     Problem: Behavior  Goal: Pt/Family maintain appropriate behavior and adhere to behavioral management agreement, if implemented  Description: INTERVENTIONS:  1. Assess patient/family's coping skills and  non-compliant behavior (including use of illegal substances)  2. Notify security of behavior or suspected illegal substances which indicate the need for search of the family and/or belongings  3. Encourage verbalization of thoughts and concerns in a socially appropriate manner  4. Utilize positive, consistent limit setting strategies supporting safety of patient, staff and others  5. Encourage participation in the decision making process about the behavioral management agreement  6. If a visitor's behavior poses a threat to safety call refer to organization policy.  7. Initiate consult with , Psychosocial CNS, Spiritual Care as appropriate  Outcome: Progressing     Problem: Anxiety  Goal: Will report anxiety at manageable levels  Description: INTERVENTIONS:  1. Administer medication as ordered  2. Teach and rehearse alternative coping skills  3. Provide emotional support with 1:1 interaction with staff  Outcome: Progressing

## 2024-04-12 NOTE — BH NOTE
Behavioral Health Transition Record to Provider    Patient Name: Johnathon Ko  YOB: 1960  Medical Record Number: [id number]  Date of Admission: 4/9/2024  Date of Discharge: 4/12/2024    Attending Provider: Sky Bradshaw MD  Discharging Provider: Sky Bradshaw MD  To contact this individual call [phone] and ask the  to page.  If unavailable, ask to be transferred to Behavioral Health Provider on call.  A Behavioral Health Provider will be available on call 24/7 and during holidays.    Primary Care Provider: No primary care provider on file.    No Known Allergies    Reason for Admission: This is a 63-year-old male with history of depression, bipolar disorder, polysubstance abuse, who came to the hospital from [facility] under TDO due to statements that he was going to kill himself by overdosing on his gabapentin and Klonopin. He states it was not really an attempt. He is on disability for his depression and he was depressed for certain, but did not really want to die. He has made some statements. He had left one hospital in [place], [place] and then he ended up back in [facility] and when he tried to leave then, he was TDO'ed and sent over to this facility. Denies suicidal or homicidal ideations. Currently, no auditory or visual hallucinations, but acknowledges that he was depressed and we need to get back on his medications since he has been off for a little while. Here for management of his condition. Reported some paranoia, feels like people are watching him or there might be a camera hidden in his bathroom mirrors, over the attic. He has had 2 prior psychiatric admissions and suicide attempts and so he was brought in for management of his condition.     Admission Diagnosis: Mental health problem [F48.9]  Bipolar 1 disorder (HCC) [F31.9]    * No surgery found *    Results for orders placed or performed during the hospital encounter of 04/09/24   POCT Glucose   Result Value Ref Range     [facility] is a program within the [address]. [facility] is open Monday- Friday 9am-4pm and offers a wide variety of groups open to [place] consumers and the community. These groups range from job readiness, Dialectical Behavior Therapy (DBT), Acudetox, diagnosis education and more.  The center also provides for those who are considering abstaining from substances or working on their SA recovery. Additionally, housing and community information is available to those who need it. Free Acudetox offered daily! See flyer HERE for more information.    To access [facility], please call [phone]. Services are currently free. [facility] strives to make itself available to everyone in the community, regardless of means or income. [address]  [place] [id number] [phone]    [facility]  Schedule an appointment as soon as possible for a visit  [address], [address] [facility] [address], [place] [address]            Advanced Directive:   Does the patient have an appointed surrogate decision maker? No  Does the patient have a Medical Advance Directive? No  Does the patient have a Psychiatric Advance Directive? No  If the patient does not have a surrogate or Medical Advance Directive AND Psychiatric Advance Directive, the patient was offered information on these advance directives No    Patient Instructions: Please continue all medications until otherwise directed by physician.      Tobacco Cessation Discharge Plan:   Is the patient a smoker and needs referral for smoking cessation? No  Patient referred to the following for smoking cessation with an appointment? No    Patient was offered medication to assist with smoking cessation at discharge? No  Was education for smoking cessation added to the discharge instructions? No    Alcohol/Substance Abuse Discharge Plan:   Does the patient have a history of substance/alcohol abuse and requires

## 2024-04-12 NOTE — BH NOTE
Patient discharged home to continue recommended plan of care. Patient received all personal belongings, valuables, home medications and discharge instructions. Prescriptions were called into the pharmacy. Patient was picked up by Hospital to home. No acute distress voiced or observed.